# Patient Record
Sex: MALE | Race: WHITE
[De-identification: names, ages, dates, MRNs, and addresses within clinical notes are randomized per-mention and may not be internally consistent; named-entity substitution may affect disease eponyms.]

---

## 2018-07-25 ENCOUNTER — HOSPITAL ENCOUNTER (EMERGENCY)
Dept: HOSPITAL 31 - C.ER | Age: 38
Discharge: HOME | End: 2018-07-25
Payer: SELF-PAY

## 2018-07-25 VITALS
RESPIRATION RATE: 20 BRPM | SYSTOLIC BLOOD PRESSURE: 128 MMHG | TEMPERATURE: 97.8 F | DIASTOLIC BLOOD PRESSURE: 77 MMHG | HEART RATE: 82 BPM

## 2018-07-25 VITALS — OXYGEN SATURATION: 99 %
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
## 2018-07-25 DIAGNOSIS — S20.219A: Primary | ICD-10-CM
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
## 2018-07-25 PROCEDURE — 99284 EMERGENCY DEPT VISIT MOD MDM: CPT

## 2018-07-25 PROCEDURE — 71101 X-RAY EXAM UNILAT RIBS/CHEST: CPT

## 2018-07-25 PROCEDURE — 96374 THER/PROPH/DIAG INJ IV PUSH: CPT

## 2018-07-25 NOTE — C.PDOC
Time Seen by Provider: 07/25/18 05:59


Chief Complaint (Nursing): Abdominal Pain





Past Medical History


Vital Signs: 





 Last Vital Signs











Temp  98.4 F   07/25/18 05:43


 


Pulse  69   07/25/18 05:43


 


Resp  18   07/25/18 05:43


 


BP  136/89   07/25/18 05:43


 


Pulse Ox  99   07/25/18 05:43














- [facility] Procedures











DEBRID OPN FX-RADIUS/ULN (06/27/13)











- Social History


Hx Alcohol Use: Yes


Hx Substance Use: No





- Immunization History


Hx Tetanus Toxoid Vaccination: No


Hx Influenza Vaccination: No


Hx Pneumococcal Vaccination: No





ED Course And Treatment


O2 Sat by Pulse Oximetry: 99





Disposition





- Disposition


Referrals: 


[facility] at [facility] [Outside]


Disposition: HOME/ ROUTINE


Disposition Time: 06:55


Condition: GOOD


Additional Instructions: 


Follow up with the medical doctor within 1-3 days. return if worsened. 


Prescriptions: 


Cyclobenzaprine [Flexeril] 5 mg PO TID #21 tab


Naproxen [Naprosyn] 500 mg PO BID #20 tab


Instructions:  Bruised Rib (DC)


Forms:  [facility] (English), [employer] Excuse


Print Language: Finnish





- Clinical Impression


Clinical Impression: 


 Abdominal pain

## 2018-07-25 NOTE — C.PDOC
History Of Present Illness


39 y/o male presents to ED for complaints of left sided rib area pain that 

began 5 days ago after he fell in the bathroom. Patient states symptoms worsen 

with movement and deep breathing. Denies SOB, dysuria, back pain, neck pain, or 

any other physical complaints. 


Time Seen by Provider: 07/25/18 05:59


Chief Complaint (Nursing): Abdominal Pain


History Per: Patient


History/Exam Limitations: no limitations


Onset/Duration Of Symptoms: Hrs


Current Symptoms Are (Timing): Still Present


Radiation Of Pain To:: None


Associated Symptoms: denies: Fever, Chills, Nausea, Vomiting, Diarrhea


Exacerbating Factors: Movement, Deep Breaths


Alleviating Factors: None


Last Bowel Movement: Today


Recent travel outside of the [place]: No





Past Medical History


Reviewed: Historical Data, Nursing Documentation, Vital Signs


Vital Signs: 


 Last Vital Signs











Temp  97.8 F   07/25/18 07:04


 


Pulse  82   07/25/18 07:04


 


Resp  20   07/25/18 07:04


 


BP  128/77   07/25/18 07:04


 


Pulse Ox  99   07/25/18 07:04














- Medical History


PMH: No Chronic Diseases





- [facility] Procedures








DEBRID OPN FX-RADIUS/ULN (06/27/13)








Family History: States: No Known Family Hx





- Social History


Hx Alcohol Use: Yes


Hx Substance Use: No





- Immunization History


Hx Tetanus Toxoid Vaccination: No


Hx Influenza Vaccination: No


Hx Pneumococcal Vaccination: No





Review Of Systems


Constitutional: Negative for: Fever, Chills


Gastrointestinal: Negative for: Nausea, Vomiting, Abdominal Pain, Diarrhea


Musculoskeletal: Positive for: Other (Left sided rib area pain )


Skin: Negative for: Rash


Neurological: Negative for: Weakness, Numbness





Physical Exam





- Physical Exam


Appears: Well, Non-toxic, No Acute Distress


Skin: Normal Color, Warm, Dry, No Rash, No Ecchymosis


Head: Atraumatic, Normacephalic


Eye(s): bilateral: Normal Inspection, PERRL, EOMI


Nose: Normal, No Discharge, No Deformity


Oral Mucosa: Moist


Neck: Normal ROM, No Midline Cervical Tenderness, No Paracervical Tenderness, 

Supple


Chest: Symmetrical, Tenderness (Lateral left 8th-9th ribs )


Cardiovascular: Rhythm Regular, No Friction Rub, No Murmur


Respiratory: Normal Breath Sounds, No Decreased Breath Sounds, No Rales, No 

Rhonchi, No Wheezing


Gastrointestinal/Abdominal: Normal Exam, Soft, No Tenderness, No Distention, No 

Guarding, No Rebound


Back: Normal Inspection, No CVA Tenderness, No Vertebral Tenderness


Extremity: Normal ROM, No Tenderness, No Pedal Edema, No Deformity, No Swelling


Extremity: Bilateral: Atraumatic, Normal Color And Temperature, Normal ROM


Pulses: Left Radial: Normal, Right Radial: Normal


Neurological/Psych: Oriented x3, Normal Speech, Normal Motor, Normal Sensation, 

Other (No focal deficits )


Gait: Steady





ED Course And Treatment


O2 Sat by Pulse Oximetry: 99 (RA)


Pulse Ox Interpretation: Normal





- Other Rad


  ** Rib series


X-Ray: Interpreted by Me


Interpretation: Negative





Medical Decision Making


Medical Decision Making: 


Administered Toradol.


Ordered Chest and ribs X-Ray. On re-exam, the patient reports improvement of 

symptoms. Lungs are CTA, heart is RRR, abdomen is soft, non-tender and the 

patient is tolerating PO well. Follow up with the medical doctor within 1-2 

days. Return if worsened. 





Disposition





- Disposition


Referrals: 


[facility] [Outside]


Disposition: HOME/ ROUTINE


Disposition Time: 06:30


Condition: GOOD


Additional Instructions: 


Follow up with the medical doctor within 1-3 days. return if worsened. 


Prescriptions: 


Cyclobenzaprine [Flexeril] 5 mg PO TID #21 tab


Naproxen [Naprosyn] 500 mg PO BID #20 tab


Instructions:  Bruised Rib (DC)


Forms:  [facility] (English), [employer] Excuse


Print Language: Japanese





- Clinical Impression


Clinical Impression: 


 Rib contusion








- PA / NP / Resident Statement


MD/DO has reviewed & agrees with the documentation as recorded.





- Scribe Statement


The provider has reviewed the documentation as recorded by the Aubrey Roque





All medical record entries made by the Aubrey were at my direction and 

personally dictated by me. I have reviewed the chart and agree that the record 

accurately reflects my personal performance of the history, physical exam, 

medical decision making, and the department course for this patient. I have 

also personally directed, reviewed, and agree with the discharge instructions 

and disposition.

## 2018-07-25 NOTE — RAD
Date of service: 



07/25/2018



PROCEDURE:  Radiographs of the Chest and Left Ribs.



HISTORY:

rib injury, pleuritic pain



COMPARISON:

None available. 



TECHNIQUE:

Frontal radiograph of the chest and multiple oblique radiographs of 

the left ribs were obtained.



FINDINGS:



LEFT RIBS:

No fracture or focal lesion visualized.



LUNGS:

No acute pulmonary disease appreciated bilaterally.



PLEURA:

No pneumothorax or pleural fluid.



CARDIOVASCULAR:

Normal sized heart. No pulmonary vascular congestion.



OTHER FINDINGS:

None.



IMPRESSION:

Unremarkable radiographs of the chest and left ribs. No left rib 

fracture.